# Patient Record
Sex: FEMALE | URBAN - METROPOLITAN AREA
[De-identification: names, ages, dates, MRNs, and addresses within clinical notes are randomized per-mention and may not be internally consistent; named-entity substitution may affect disease eponyms.]

---

## 2019-12-23 ENCOUNTER — OCC HEALTH (OUTPATIENT)
Dept: OTHER | Facility: HOSPITAL | Age: 29
End: 2019-12-23
Attending: PREVENTIVE MEDICINE

## 2019-12-23 DIAGNOSIS — Z01.84 IMMUNITY STATUS TESTING: Primary | ICD-10-CM

## 2019-12-23 PROCEDURE — 85025 COMPLETE CBC W/AUTO DIFF WBC: CPT

## 2019-12-23 PROCEDURE — 87389 HIV-1 AG W/HIV-1&-2 AB AG IA: CPT

## 2019-12-23 PROCEDURE — 86803 HEPATITIS C AB TEST: CPT

## 2019-12-23 PROCEDURE — 86706 HEP B SURFACE ANTIBODY: CPT

## 2019-12-23 PROCEDURE — 80053 COMPREHEN METABOLIC PANEL: CPT

## 2022-11-11 ENCOUNTER — NURSE ONLY (OUTPATIENT)
Dept: INTERNAL MEDICINE CLINIC | Facility: HOSPITAL | Age: 32
End: 2022-11-11
Attending: EMERGENCY MEDICINE

## 2022-11-11 DIAGNOSIS — Z00.00 WELLNESS EXAMINATION: Primary | ICD-10-CM

## 2022-11-11 PROCEDURE — 86480 TB TEST CELL IMMUN MEASURE: CPT

## 2022-11-14 LAB
M TB IFN-G CD4+ T-CELLS BLD-ACNC: 0.12 IU/ML
M TB TUBERC IFN-G BLD QL: NEGATIVE
M TB TUBERC IGNF/MITOGEN IGNF CONTROL: >10 IU/ML
QFT TB1 AG MINUS NIL: -0.01 IU/ML
QFT TB2 AG MINUS NIL: 0 IU/ML

## 2023-07-24 NOTE — TELEPHONE ENCOUNTER
Received staff message from provider to add patient on her ADO schedule for 8:15 AM 7/26/23    Will schedule patient tomorrow as RN needs to reach out to lead/PSR to open aforementioned spot.

## 2023-09-15 NOTE — TELEPHONE ENCOUNTER
Dr Trinity Calderón    Received fax from [facility] on 9/12/2023 for the patient's OV w/ Dr Efrem Everett on 9/12/2023    Please see OV notes in Care Everywhere.     Thank you

## 2023-09-21 NOTE — TELEPHONE ENCOUNTER
[x] [facility]  []MILE   [] [address] [facility]  Manager : Alfie Faboila    HAVE YOU RECEIVED THE COVID-19 Vaccine? Yes [x]    No []          If yes, date(s) received:          11/2/22, 12/29/22  Which vaccine:  Sanjeev Oneill []     Misty Davila []    J&J []  unknown    SYMPTOMS (reported via dashboard):  [] asymptomatic  [x] symptomatic  [] GI symptoms only    Symptom onset date: 9/21/23  Fever   > 100F             Yes []      Cough                          Yes [x]      Shortness of breath  Yes [x]      Congestion                 Yes []      Runny nose                Yes []        Loss of Smell              Yes []        Loss of Taste             Yes []       Sore throat                 Yes [x]       Fatigue                        Yes [x]       Body Aches                Yes [x]        Chills                           Yes [x]        Headache                   Yes [x]             GI symptoms             Yes [x]     No []                     Nausea   [x]          Vomiting            []                                    Diarrhea  []          Upset stomach [x]      Employee has positive COVID Exposure? Yes []     No [x]    Date of exposure:     []  Coworker                       [] patient                        [] Family/friend    Employee has a history of Covid?   Yes [x]     No []   If Yes, when: 11/29/22    When was the last shift you worked?: 9/21/23      PLAN:     COVID-19 testing ordered: [x] Rapid    [] Alinity              Date test is to be taken:   9/21/23    []  Outside testing                           Notes:    INSTRUCTIONS PROVIDED:    [x]  Employee was instructed to call Central scheduling at [phone] or use [employer] to make an appointment for their testing   [x]  May return to work if employee views negative result in [address] and remains fever, vomiting, and diarrhea free  []  May continue to work if remains asymptomatic and views negative result in [address]  []  Follow up for condition update when resulting  []  If symptoms develop, stay home and call hotline for rapid test order  []  If COVID positive results, off work minimum of 5 days from positive test or onset of symptoms (day 0)    []      On day 5, if asymptomatic or mildly symptomatic (with improving symptoms) may return to work day 6  []      On day 5, if symptomatic, call Employee Health for RTW screening        [x]  Plan noted above  []  Length of time to obtain results  []  Quarantine instructions  []  S/S of worsening infection/condition and importance of prompt medical re-evaluation including when to seek emergency care.    [] The employee voiced understanding

## 2023-09-26 NOTE — TELEPHONE ENCOUNTER
Results and RTW guidelines:    COVID RESULT:    [x] Viewed by employee in [address] RTW plan and instructions as indicated on triage call. Manager notified. Estimated RTW date:   [] Discussed with employee   [] Unable to reach by phone. Sent via [employer] message      Test type:    [x] Rapid         [] Alinity         [] Outside test:       [x] NEGATIVE     Ordered Alinity retest?  []Yes   [x] No (skip to RTW)   Ordered Rapid retest?   []Yes   [x] No (skip to RTW)        Notes:     RTW PLAN:    []  If COVID positive results, off work minimum of 5 days from positive test or onset of symptoms (day 0)        On day 5, if asymptomatic or mildly symptomatic (with improving symptoms) may return to work day 6          On day 5, if symptomatic, call Employee Health for RTW screening        []  COVID positive result - call Employee Health on day 5 after symptom onset. The employee needs to be cleared by Employee Health to RTW. [x] RTW immediately, continue to monitor for sx  [] RTW when sx improve; must be fever free for 24 hours w/o medications, Diarrhea/Vomiting free for 24 hours w/o medications  [] Alinity ordered; continue to monitor sx and call for new/worsening sx.   Discuss RTW guidelines with manager  [] May continue to work  [] Follow up with PCP  [] Home until further instruction from hotline with Alinity results  INSTRUCTIONS PROVIDED:  [x]  Plan as noted above  []  Length of time to obtain results   []  Quarantine instructions  []  Masking protocol   []  S/S of worsening infection/condition and importance of prompt medical re-evaluation including when to seek emergency care  [] If symptoms develop, stay home and call hotline for rapid test order    Estimated RTW date:      [] The employee voiced understanding of above plan/instructions  [x] Manager Notified

## 2023-09-30 NOTE — TELEPHONE ENCOUNTER
Worsening sleep. Requesting trazadone rx. 1. Sleep disturbance  - traZODone 100 MG Oral Tab; Take 1 tablet (100 mg total) by mouth nightly. Dispense: 30 tablet;  Refill: 0

## 2024-01-10 NOTE — PROGRESS NOTES
[facility] Consultation Note       Patient:  Lea Escobar  :      1990  MRN:      [id number]    Referring Provider: Dr. Nair       Chief Complaint:    Chief Complaint   Patient presents with    Consult     Non-surgical weight management consult     Weight Management       SUBJECTIVE     History of Present Illness:  Lea Escobar has been referred to me for evaluation and treatment.       32 yo who lives with family   She does the cooking  Currently at heaviest weight  Podiatrist    Patient has tried several diets in the past including exercises and is frustrated with increase of weight. Weight has been a struggle for the past several years and is now starting to develop into co-morbidities that are worrisome to the patient. Patient is interested in losing weight, so it can stay off long term.    Patient also understands that this is a life style change and wants to get on track.    Interested in non surgical weight loss    Past Medical History: History reviewed. No pertinent past medical history.    OBJECTIVE     Vitals: /70   Pulse 108   Ht 5' 4.6\" (1.641 m)   Wt 207 lb 11.2 oz (94.2 kg)   LMP 2023 (Exact Date)   SpO2 97%   BMI 34.99 kg/m²      Patient Medications:    Current Outpatient Medications   Medication Sig Dispense Refill    gabapentin 600 MG Oral Tab Take 2 tablets (1,200 mg total) by mouth nightly. TAKE AT BEDTIME      Tirzepatide-Weight Management (ZEPBOUND) 2.5 MG/0.5ML Subcutaneous Solution Auto-injector Inject 2.5 mg into the skin once a week. 3 mL 2    cyclobenzaprine 10 MG Oral Tab Take 1 tablet (10 mg total) by mouth 3 (three) times daily for 20 days. 30 tablet 0    albuterol 108 (90 Base) MCG/ACT Inhalation Aero Soln Inhale 2 puffs into the lungs every 6 (six) hours as needed for Wheezing. 6.7 g 0    LORazepam 1 MG Oral Tab Take 1 tablet (1 mg total) by mouth. (Patient not taking: Reported on 10/13/2023)         Allergies:  Patient  has no known allergies.     Comorbidities:      Social History:    Social History     Socioeconomic History    Marital status:      Spouse name: Not on file    Number of children: Not on file    Years of education: Not on file    Highest education level: Not on file   Occupational History    Not on file   Tobacco Use    Smoking status: Never    Smokeless tobacco: Never   Substance and Sexual Activity    Alcohol use: Not on file    Drug use: Not on file    Sexual activity: Not on file   Other Topics Concern    Not on file   Social History Narrative    Not on file     Social Determinants of Health     Financial Resource Strain: Not on file   Food Insecurity: Not on file   Transportation Needs: Not on file   Physical Activity: Not on file   Stress: Not on file   Social Connections: Not on file   Housing Stability: Not on file     Surgical History:    Past Surgical History:   Procedure Laterality Date             Family History:  History reviewed. No pertinent family history.        Typical Dietary Intake:  Breakfast AM Snack Lunch PM Snack Dinner   Eggs, croissants, coffee, bagel with cream cheese, hash brown chocolate Hot lunch, water, coffee Rice pudding Rice, stew, chicken, salad   Sweet tooth  Eats quickly  Portions    Soda Drinker?: No  If yes, how much?:      Number of restaurant or fast food meals/week:  3 meals/week    Nutritional Goals Reviewed and Discussed:     Limit carbohydrates to 100 gms per day, Eat 100-200 calories within 1 hour of waking up, and Eat 3-4 cups of fresh fruit or vegetables daily    Behavior Modifications Reviewed and Discussed:    Eat breakfast, Eat 3 meals per day, Plan meals in advance, Read nutrition labels, Drink 64oz of water per day, Maintain a daily food journal, No drinking 30 minutes before or after meals, Utilize portion control strategies to reduce calorie intake, Identify triggers for eating and manage cues, and Eat slowly and take 20 to 30 minutes to complete  each meal      ROS:  Constitutional: positive for fatigue  Respiratory: negative  Cardiovascular: negative  Gastrointestinal: negative  Musculoskeletal:negative  Neurological: negative  Behavioral/Psych: negative  Endocrine: negative  All other systems were reviewed and are negative.    Physical Exam:  General appearance: alert, appears stated age, cooperative, and moderately obese  Head: Normocephalic, without obvious abnormality, atraumatic  Back: symmetric, no curvature. ROM normal. No CVA tenderness.  Lungs: clear to auscultation bilaterally  Heart: S1, S2 normal, no murmur, click, rub or gallop, regular rate and rhythm  Abdomen: soft, non-tender; bowel sounds normal; no masses,  no organomegaly  Extremities: extremities normal, atraumatic, no cyanosis or edema  Pulses: 2+ and symmetric  Skin: Skin color, texture, turgor normal. No rashes or lesions  Neurologic: Grossly normal    ASSESSMENT         Encounter Diagnosis(ses):   1. Binge eating    2. Stress    3. Primary insomnia    4. Obesity (BMI 30-39.9)        PLAN     Patient is not interested in bariatric surgery. Patient desires to pursue traditional weight loss at this time.      Binge eating: will continue to eat  Large portions    Goals for next month:  1. Keep a food log.  2. Drink 48-64 ounces of non-caloric beverages per day. No fruit juices or regular soda.  3. Increase activity-upper body exercises, walk 10 minutes per day.  4. Increase fruit and vegetable servings to 5-6 per day.      Will start Zepbound    May benefit from Vyvanse    Refer to [facility]        Diagnoses and all orders for this visit:    Binge eating    Stress    Primary insomnia    Obesity (BMI 30-39.9)  -     Tirzepatide-Weight Management (ZEPBOUND) 2.5 MG/0.5ML Subcutaneous Solution Auto-injector; Inject 2.5 mg into the skin once a week.        Wilfrido Fitzgerald MD

## 2024-05-09 NOTE — TELEPHONE ENCOUNTER
Pt requesting annual lab work orders.       - CBC With Differential With Platelet; Future  - Comp Metabolic Panel (14); Future  - Hemoglobin A1C; Future  - Lipid Panel; Future  - TSH W Reflex To Free T4; Future  - Vitamin D; Future  - Vitamin B12; Future  - Iron And Tibc [E]; Future

## 2024-08-30 NOTE — PATIENT INSTRUCTIONS
A1C: 6.5% today --> previously was 5.7% on 5/10/2024  Blood glucose: 79 --> 74 in clinic today    Medications:   -Start Lantus 8 units daily in morning   -start Metformin 500mg with breakfast       - please give me an update on glucose readings on Monday night/Tuesday       A1C goal:  <6.0%    Blood sugar testing:  Start using Dexcom G7 continuous glucometer     Blood sugar targets:  Before breakfast: <90  2 hours after meals: <120     Call for persistent blood sugars < 75 or > 200

## 2024-08-30 NOTE — TELEPHONE ENCOUNTER
Current Outpatient Medications   Medication Sig Dispense Refill     90 Day Prescription Request  3 mL 0      30 each 0    metFORMIN 500 MG Oral Tab Take 1 tablet (500 mg total) by mouth daily with breakfast. 30 tablet 0

## 2024-08-30 NOTE — TELEPHONE ENCOUNTER
Current Outpatient Medications   Medication Sig Dispense Refill            Key:[id number]                     Continuous Glucose Sensor (DEXCOM G7 SENSOR) Does not apply Misc 1 each Every 10 days. Use as directed every 10 days 1 each 5

## 2024-08-30 NOTE — PROGRESS NOTES
Name: Lea Escobar  Date: 2024    CHIEF COMPLAINT   GDM, third trimester     HISTORY OF PRESENT ILLNESS   Lea Escobar is a 33 year old year old female who presents for new consultation on gestational diabetes management. She is currently 37/5 weeks pregnant and is currently followed by Dr. Nate Reed ([facility]) with OB. She is scheduled for elective  at 39 weeks.   HgbA1C: 6.5% at POC today. Previously was 5.7% on 5/10/2024.   Blood glucose is: 79 --> 74--> 107 in clinic today. Patient notes that she had been very busy at work today and had to postpone lunch meal. She plans to eat as soon as she if finished with visit.      FAMILY HISTORY OF DIABETES  - father and paternal grandmother; sister had GDM  DIABETES HISTORY  Diagnosed:   Prior HbA, C or glycohemoglobin were 5.4% 2023; 5.7% on 5/10/2024; 6.5% at POC today;     PREVIOUS MEDICATION FOR DM:  None   CURRENT MEDICATIONS FOR DM:  None     HOME GLUCOSE READINGS:   Dexcom G7 CGM download reviewed with patient. The results revealed ( to ):    Average glucose: 119mg/dl     In target range: (70-180mg/dl) :96%     High glucose targets: (> 180mg/dl): 2%     Very high glucose targets: (> 250mg/dl): 0%     Low glucose targets: (less than 70mg/dl): 2%     Very Low glucose targets: (< 54mg/dl ): 0%     Glucose Management Indicator (GMI): n/a  Glucose Variability: 28mg/dL    Glucose interpretation:   -Postprandial hyperglycemia was noted at 2hrs after meals with readings in 130s-140s  -overnight glucose readings range between 90s-110s.   - overnight compression low glucose readings noted.     DIETARY COMPLIANCE:  Breakfast:    Boiled eggs and coffee with sugar and cream (maybe cheese)  Lunch:    Physician louge food; chicken with hot/sweet sauces and zhucini      Dinner around 8:30pm    Chicken wings and lentil soup       Eating more protein and greens      EXERCISE:   No - walking as much as safely able     REVIEW  OF SYSTEMS  Constitutional: Negative for: weight change, fever, fatigue, cold/heat intolerance  Eyes: Negative for:  Visual changes, proptosis, blurring  ENT: Negative for:  dysphagia, neck swelling, dysphonia  Respiratory: Negative for: hemoptysis, shortness of breath, cough, or dyspnea.  Cardiovascular: Negative for:  chest pain, chest discomfort, palpitations  GI: Negative for:  abdominal pain, nausea, vomiting, diarrhea, heartburn, constipation  Neurology: Negative for: headache, dizziness, syncope, numbness/tingling, or weakness.   Genito-Urinary: Negative for: dysuria, frequency or hematuria   Hematology/Lymphatics: Negative for: bruising, easy bleeding, lower extremity edema  Endocrine: Negative for: polyuria, polydipsia. no thyroid disease. no osteoporosis.   Skin: Negative for: rash, blister, infection or ulcers.    MEDICATIONS:     Current Outpatient Medications:     insulin glargine (LANTUS SOLOSTAR) 100 UNIT/ML Subcutaneous Solution Pen-injector, Inject 8 Units into the skin every morning., Disp: 3 mL, Rfl: 0    Insulin Pen Needle 32G X 4 MM Does not apply Misc, Use daily with Lantus insulin, Disp: 30 each, Rfl: 0    metFORMIN 500 MG Oral Tab, Take 1 tablet (500 mg total) by mouth daily with breakfast., Disp: 30 tablet, Rfl: 0    Alcohol Swabs Does not apply Pads, Use daily, Disp: 30 each, Rfl: 0    Lancets 30G Does not apply Misc, Use once daily, Disp: 100 each, Rfl: 0    Lancet Devices (LANCING DEVICE) Does not apply Misc, Use once daily, Disp: 1 each, Rfl: 0    Glucose Blood In Vitro Strip, Use to check fasting blood sugar once daily, Disp: 100 strip, Rfl: 1    Ferrous Sulfate 325 (65 Fe) MG Oral Tab, Take 1 tablet (325 mg total) by mouth daily with breakfast., Disp: 90 tablet, Rfl: 1    Vitamin C 500 MG Oral Tab, Take 1 tablet (500 mg total) by mouth daily., Disp: 90 tablet, Rfl: 1    zolpidem (AMBIEN) 5 MG Oral Tab, Take 1 tablet (5 mg total) by mouth nightly as needed for Sleep., Disp: 10 tablet,  Rfl: 1    gabapentin 600 MG Oral Tab, Take 2 tablets (1,200 mg total) by mouth nightly. TAKE AT BEDTIME (Patient not taking: Reported on 2024), Disp: , Rfl:     Tirzepatide-Weight Management (ZEPBOUND) 2.5 MG/0.5ML Subcutaneous Solution Auto-injector, Inject 2.5 mg into the skin once a week. (Patient not taking: Reported on 2024), Disp: 3 mL, Rfl: 2    gabapentin 100 MG Oral Cap, Take 1 capsule (100 mg total) by mouth nightly. (Patient not taking: Reported on 2024), Disp: 30 capsule, Rfl: 0    albuterol 108 (90 Base) MCG/ACT Inhalation Aero Soln, Inhale 2 puffs into the lungs every 6 (six) hours as needed for Wheezing., Disp: 6.7 g, Rfl: 0    LORazepam 1 MG Oral Tab, Take 1 tablet (1 mg total) by mouth. (Patient not taking: Reported on 10/13/2023), Disp: , Rfl:     ALLERGIES:   No Known Allergies    SOCIAL HISTORY:   Social History     Socioeconomic History    Marital status:    Tobacco Use    Smoking status: Never    Smokeless tobacco: Never     PAST MEDICAL HISTORY:   History reviewed. No pertinent past medical history.    PAST SURGICAL HISTORY:   Past Surgical History:   Procedure Laterality Date           PHYSICAL EXAM:   Vitals:    24 1335   BP: 121/75   Pulse: 93   Weight: 252 lb (114.3 kg)   Height: 5' 4.6\" (1.641 m)     BMI:   Body mass index is 42.46 kg/m².    PHYSICAL EXAM  General Appearance:  alert, well developed, in no acute distress  Eyes:  normal conjunctivae, sclera., normal sclera and normal pupils  Throat/Neck: normal sound to voice.   Back: no kyphosis  Respiratory:  non-labored. no increased work of breathing.    Skin:  normal moisture and skin texture  Hematologic:  no excessive bruising  Psychiatric:  oriented to time, self, and place      LABS: Pertinent labs reviewed    ASSESSMENT/PLAN:    -Reviewed with patient the pathogenesis of diabetes, clinical significance of A1c, and common complications such as: microvascular, macrovascular and diabetic  ketoacidosis. Patient verbalizes understanding of the importance of glycemic control and the goals of therapy.   -Discussed with patient glucose targets ranges (Fasting <90 and 2hr post prandial <120).   1.Gestational Diabetes Mellitus, third trimester   -LAB DATA  HbA,C: 6.5% today   a) Medications  - start Lantus 8 units daily in AM   - start Metformin 500mg daily with breakfast   - new cgm sensor applied in clinic today (previous had fallen off)     - discussed that she gives us an update on her glucose readings in 3-4 days and again in 3-4 days after that.   -reviewed limiting carbs to 165gm of carbs/day.   -discussed importance of incorporating low carb snacks during the day.   -reviewed blood sugar target goals: fasting <90 and 2hrs post prandial <120  -Discussed dangers of pregnancy and possible fetal complications.   -Discussed importance of glycemic control for fetus  -Discussed insulin injection site rotation    b) No nephropathy: GFR: 127 on 5/10/2024  c) continue wearing Dexcom g7 cgm  d) Life style changes: Diet: low carbohydrate diet 165-175gm per day discussed, Exercise: at least 150min a week.    2. Blood Pressure Management   -normotensive today     3.Lipids Management   -LDL: 117 and Tri on 5/10/2024      RTC as needed     Patient instructed to call sooner if they develop Blood glucose readings <75 and/or if they have readings persistently >200.     The risks and benefits of my recommendations, as well as other treatment options were discussed with the patient today. questions were also answered to the best of my knowledge. Patient verbalizes understanding of these issues and agrees to the plan.    2024  DAKOTAH Nixon

## 2024-09-03 NOTE — TELEPHONE ENCOUNTER
Medication PA Requested: DEXCOM G7 SENSOR                                                          CoverMyMeds Used:  Key:  Quantity: 9  Day Supply: 90  Sig: use 1 every 10 days  DX Code:              O24.410

## 2024-09-04 NOTE — TELEPHONE ENCOUNTER
Medication PA Requested: DEXCOM G7 SENSOR                                                          CoverMyMeds Used: Yes  Key: [id number]  Quantity: 9  Day Supply: 90  Sig: use 1 every 10 days  DX Code:  O24.410        Cover My Meds submitted, last office visit 8/30 and A1C 8/30  Awaiting determination

## 2024-09-04 NOTE — TELEPHONE ENCOUNTER
Continuous Glucose Sensor (DEXCOM G7 SENSOR) Does not apply Misc, 1 each Every 10 days. Use as directed every 10 days, Disp: 1 each, Rfl: 5  Plan does not cover med. Contact pharmacy. Patient id:[id number]

## 2024-12-26 NOTE — PROGRESS NOTES
[facility], [place]  [address]  [facility] [address]  Dept: [phone]       Patient:  Lea Escobar  :      1990  MRN:      [id number]    Chief Complaint:    Chief Complaint   Patient presents with    Follow - Up    Weight Management       SUBJECTIVE     History of Present Illness:  Lea is being seen today for a follow-up for non surgical weight loss    Past Medical History:   Past Medical History:    Dyslipidemia        Comorbidities:  Back pain-Improvement?  yes, Joint pain-Improvement?  yes, Diabetes-Improvement?  yes, LORE-Improvement?  yes, and Snoring-Improvement?  yes    OBJECTIVE     Vitals: /80   Pulse 89   Ht 5' 4.6\" (1.641 m)   Wt 226 lb (102.5 kg)   SpO2 98%   BMI 38.08 kg/m²     Initial weight loss: +19   Total weight loss: +19    Start weight: 226    Wt Readings from Last 3 Encounters:   24 226 lb (102.5 kg)   24 252 lb (114.3 kg)   01/10/24 207 lb 11.2 oz (94.2 kg)       Patient Medications:    Current Outpatient Medications   Medication Sig Dispense Refill    Tirzepatide (MOUNJARO) 2.5 MG/0.5ML Subcutaneous Solution Auto-injector Inject 2.5 mg into the skin once a week. 2 mL 2    ergocalciferol 1.25 MG (20437 UT) Oral Cap Take 1 capsule (50,000 Units total) by mouth once a week for 12 doses. 12 capsule 0    traZODone 100 MG Oral Tab Take 1 tablet (100 mg total) by mouth nightly. 90 tablet 1    Ferrous Sulfate 325 (65 Fe) MG Oral Tab Take 1 tablet (325 mg total) by mouth daily with breakfast. 90 tablet 1    Vitamin C 500 MG Oral Tab Take 1 tablet (500 mg total) by mouth daily. 90 tablet 1    zolpidem (AMBIEN) 5 MG Oral Tab Take 1 tablet (5 mg total) by mouth nightly as needed for Sleep. 10 tablet 1    gabapentin 600 MG Oral Tab Take 2 tablets (1,200 mg total) by mouth nightly. TAKE AT BEDTIME (Patient not taking: Reported on 2024)      gabapentin 100 MG Oral Cap Take 1 capsule (100  mg total) by mouth nightly. (Patient not taking: Reported on 8/30/2024) 30 capsule 0    LORazepam 1 MG Oral Tab Take 1 tablet (1 mg total) by mouth. (Patient not taking: Reported on 10/13/2023)       Allergies:  Patient has no known allergies.     Social History:    Social History     Socioeconomic History    Marital status:      Spouse name: Not on file    Number of children: Not on file    Years of education: Not on file    Highest education level: Not on file   Occupational History    Not on file   Tobacco Use    Smoking status: Never    Smokeless tobacco: Never   Substance and Sexual Activity    Alcohol use: Not on file    Drug use: Not on file    Sexual activity: Not on file   Other Topics Concern    Not on file   Social History Narrative    Not on file     Social Drivers of Health     Financial Resource Strain: Low Risk  (9/16/2024)    Received from [facility]    Overall Financial Resource Strain (CARDIA)     Difficulty of Paying Living Expenses: Not hard at all   Food Insecurity: No Food Insecurity (9/16/2024)    Received from [facility]    Hunger Vital Sign     Worried About Running Out of Food in the Last Year: Never true     Ran Out of Food in the Last Year: Never true   Transportation Needs: No Transportation Needs (9/16/2024)    Received from [facility]    PRAPARE - Transportation     Lack of Transportation (Medical): No     Lack of Transportation (Non-Medical): No   Physical Activity: Not on file   Stress: Not on file   Social Connections: Low Risk  (6/14/2023)    [facility]    Social Connections     How often do you see or talk to people that you care about and feel close to? (For example: talking to friends on the phone, visiting friends or family, going to Buddhism or club meetings): 5 or more times a week   Housing Stability: Low Risk  (9/16/2024)    Received from [facility]  [facility]    Housing Stability Vital Sign     Unable to Pay for Housing in the Last Year: No     Number of Times Moved in the Last Year: 1     Homeless in the Last Year: No     Surgical History:    Past Surgical History:   Procedure Laterality Date           Family History:  No family history on file.    Food Journal  Reviewed and Discussed:       Patient has a Food Journal?: yes   Patient is reading nutrition labels?  yes  Average Caloric Intake:     Average CHO Intake: 120  Is patient exercising? no  Type of exercise?     Eating Habits  Patient states the following:  Eats 1 meal(s) per day  Length of time it takes to consume a meal:  20  # of snacks per day: 1 Type of snacks:  protein bar  Amount of soda consumption per day:    Amount of water (in ounces) per day:  64  Drinking between meals only:  yes  Toughest challenge:  exercise    Nutritional Goals  Limit carbohydrates to 100 gms per day, Eat 100-200 calories within 1 hour of waking , and Eat 3-4 cups of fresh fruits or vegetables daily    Behavior Modifications Reviewed and Discussed  Eat breakfast, Eat 3 meals per day, Plan meals in advance, Read nutrition labels, Drink 64 oz of water per day, Maintain a daily food journal, No drinking 30 minutes before or after meals, Utlize portion control strategies to reduce calorie intake, Identify triggers for eating and manage cues, and Eat slowly and take 20 to 30 minutes to complete each meal    Exercise Goals Reviewed and Discussed    Increase as tolerated    ROS:    Constitutional: negative  Respiratory: negative  Cardiovascular: negative  Gastrointestinal: negative  Musculoskeletal:negative  Neurological: negative  Behavioral/Psych: negative  Endocrine: negative  All other systems were reviewed and are negative    Physical Exam:   General appearance: alert, appears stated age, cooperative, and mildly obese  Head: Normocephalic, without obvious abnormality, atraumatic  Back: symmetric, no  curvature. ROM normal. No CVA tenderness.  Lungs: clear to auscultation bilaterally  Heart: S1, S2 normal, no murmur, click, rub or gallop, regular rate and rhythm  Abdomen: soft, non-tender; bowel sounds normal; no masses,  no organomegaly  Extremities: extremities normal, atraumatic, no cyanosis or edema  Pulses: 2+ and symmetric  Skin: Skin color, texture, turgor normal. No rashes or lesions  Neurologic: Grossly normal    ASSESSMENT       Encounter Diagnosis(ses):   Encounter Diagnoses   Name Primary?    Insulin controlled gestational diabetes mellitus (GDM) during pregnancy, antepartum (HCC) Yes    Binge eating     Dyslipidemia     Stress     Obesity (BMI 30-39.9)        PLAN     Patient is not interested in bariatric surgery. Patient desires to pursue traditional weight loss at this time.      DIABETES: Continue current medications.    DYSLIPIDEMIA: Stable on the above prescribed meal plan . Liver function stable.    Lab Results   Component Value Date/Time    CHOLEST 178 12/17/2024 11:13 AM    LDL 87 12/17/2024 11:13 AM    HDL 60 (H) 12/17/2024 11:13 AM    TRIG 182 (H) 12/17/2024 11:13 AM    VLDL 29 12/17/2024 11:13 AM     Goals for next month:  1. Keep a food log.  2. Drink 48-64 ounces of non-caloric beverages per day. No fruit juices or regular soda.  3. Increase activity-upper body exercises, walk 10 minutes per day.  4. Increase fruit and vegetable servings to 5-6 per day.      Will start Mounjaro    Aware she should discontinue if pregnancy occurs    Diagnoses and all orders for this visit:    Insulin controlled gestational diabetes mellitus (GDM) during pregnancy, antepartum (HCC)  -     Tirzepatide (MOUNJARO) 2.5 MG/0.5ML Subcutaneous Solution Auto-injector; Inject 2.5 mg into the skin once a week.    Binge eating    Dyslipidemia    Stress    Obesity (BMI 30-39.9)          Wilfrido Fitzgerald MD

## 2025-03-03 NOTE — TELEPHONE ENCOUNTER
Condition update: Navigator called [employer] insurance to review Dexcom G7 sensor coverage. Per representative a PA is required. Navigator initiated PA after review the PA was approved as there was an approved PA in record valid from August 4,2024-Sept 4,2026. Per representative patient is to use a [facility] or [facility] pharmacy. Test claim was process for a 30-day supply it will be $71.51 & for a 90-day supply it will be $150.   Message sent to provider.     Dexcom G7  QTY: 3  Case #: [id number]  Valid August 4,2026-Sept 4,2026

## 2025-06-02 NOTE — TELEPHONE ENCOUNTER
Pt scheduled on Thursday at 1220 per Dr. Pitts. RN LMTCB to confirm scheduled appointment with patient. RN also left a message via 115 network disks. Pt to confirm scheduled appointment..

## 2025-06-06 NOTE — PROGRESS NOTES
Lea Escobar is a 34 year old female.   Chief Complaint   Patient presents with    New Patient    Nose Problem     Left nostril is tender and sensitive      HPI:   Patient here for nasal crusting and tenderness of her left nares    Current Medications[1]   Past Medical History[2]   Social History:  Short Social Hx on File[3]     REVIEW OF SYSTEMS:   GENERAL HEALTH: feels well otherwise  GENERAL : denies fever, chills, sweats, weight loss, weight gain  SKIN: denies any unusual skin lesions or rashes  RESPIRATORY: denies shortness of breath with exertion  NEURO: denies headaches    EXAM:   Ht 5' 4.6\" (1.641 m)   Wt 189 lb 9.6 oz (86 kg)   BMI 31.94 kg/m²   System Details   Skin Inspection - Normal.   Constitutional Overall appearance - Normal.   Head/Face Facial features - Normal. Eyebrows - Normal. Skull - Normal.   Eyes Conjunctiva - Right: Normal, Left: Normal. Pupil - Right: Normal, Left: Normal.    Ears Inspection - Right: Normal, Left: Normal.   Canal - Right: Normal, Left: Normal.   TM - Right: Normal, Left: Normal.   Nasal External nose - Normal.   Nasal septum - Normal.  Turbinates - Normal.  Crusting of the nasal vestibule left greater than right.  Culture taken on the left.   Oral/Oropharynx Lips - Normal, Tonsils - Normal, Tongue - Normal    Neck Exam Inspection - Normal. Palpation - Normal. Parotid gland - Normal. Thyroid gland - Normal.   Lymph Detail Submental. Submandibular. Anterior cervical. Posterior cervical. Supraclavicular all without enlargement   Psychiatric Orientation - Oriented to time, place, person & situation. Appropriate mood and affect.   Neurological Memory - Normal. Cranial nerves - Cranial nerves II through XII grossly intact.     ASSESSMENT AND PLAN:   1. Nasal crusting  Culture taken as above.  Will check for MRSA.  Patient placed on Bactrim DS and mupirocin ointment.  I will of course call the patient with all results.  Follow-up if symptoms do not completely  resolved.    - Aerobic Bacterial Culture; Future  - Aerobic Bacterial Culture      The patient indicates understanding of these issues and agrees to the plan.      Celia Pitts MD  6/5/2025  7:26 PM       [1]   Current Outpatient Medications   Medication Sig Dispense Refill    OLANZapine 10 MG Oral Tab Take 1 tablet (10 mg total) by mouth nightly.      sulfamethoxazole-trimethoprim -160 MG Oral Tab per tablet Take 1 tablet by mouth every 12 (twelve) hours. 20 tablet 0    mupirocin 2 % External Ointment Apply with cotton swab to bilateral nares twice as needed for crusting. 15 g 2    Tirzepatide (MOUNJARO) 7.5 MG/0.5ML Subcutaneous Solution Auto-injector Inject 7.5 mg into the skin once a week. 2 mL 5    Continuous Glucose Sensor (DEXCOM G7 SENSOR) Does not apply Misc 1 each Every 10 days. Use as directed every 10 days 3 each 5    traZODone 100 MG Oral Tab Take 1 tablet (100 mg total) by mouth nightly. 90 tablet 1    Ferrous Sulfate 325 (65 Fe) MG Oral Tab Take 1 tablet (325 mg total) by mouth daily with breakfast. 90 tablet 1    Vitamin C 500 MG Oral Tab Take 1 tablet (500 mg total) by mouth daily. 90 tablet 1    zolpidem (AMBIEN) 5 MG Oral Tab Take 1 tablet (5 mg total) by mouth nightly as needed for Sleep. 10 tablet 1    gabapentin 600 MG Oral Tab Take 2 tablets (1,200 mg total) by mouth nightly. TAKE AT BEDTIME (Patient not taking: Reported on 6/5/2025)      gabapentin 100 MG Oral Cap Take 1 capsule (100 mg total) by mouth nightly. (Patient not taking: Reported on 6/5/2025) 30 capsule 0    LORazepam 1 MG Oral Tab Take 1 tablet (1 mg total) by mouth. (Patient not taking: Reported on 6/5/2025)     [2]   Past Medical History:   Dyslipidemia   [3]   Social History  Socioeconomic History    Marital status:    Tobacco Use    Smoking status: Never    Smokeless tobacco: Never   Vaping Use    Vaping status: Never Used   Substance and Sexual Activity    Alcohol use: Never    Drug use: Never     Social  Drivers of Health     Food Insecurity: No Food Insecurity (9/16/2024)    Received from [facility]    Hunger Vital Sign     Worried About Running Out of Food in the Last Year: Never true     Ran Out of Food in the Last Year: Never true   Transportation Needs: No Transportation Needs (9/16/2024)    Received from [facility]    PRAPARE - Transportation     Lack of Transportation (Medical): No     Lack of Transportation (Non-Medical): No   Housing Stability: Low Risk  (9/16/2024)    Received from [facility]    Housing Stability Vital Sign     Unable to Pay for Housing in the Last Year: No     Number of Times Moved in the Last Year: 1     Homeless in the Last Year: No

## 2025-06-06 NOTE — PATIENT INSTRUCTIONS
Culture was taken and you were placed on mupirocin ointment and Bactrim for your nasal crusting.  I will of course notify you of the culture results.  Follow-up if symptoms do not completely resolved.

## 2025-06-09 NOTE — TELEPHONE ENCOUNTER
Received directions for mupirocin via secure chat from Dr. Pitts.    Contacted [facility] pharmacy, per [place] pharmacist given directions for mupirocin 2% ointment twice a day to each nostril until crusting resolves or 10 days total  and then may repeat as needed. Verbalized understanding.   Will fill prescription for patient.

## 2025-06-09 NOTE — TELEPHONE ENCOUNTER
Per Taniya calling to clarify mupirocin 2% ointment directions, if it is twice daily or two times total. Please advise

## 2025-06-10 NOTE — TELEPHONE ENCOUNTER
Patient with significant constipation.  Has already tried over-the-counter medications with minimal symptoms.  Will trial Linzess.    1. Constipation, unspecified constipation type    - linaCLOtide (LINZESS) 72 MCG Oral Cap; Take 72 mcg by mouth daily. Take Linzess at least 30 minutes before your first meal of the day, on an empty stomach.  Dispense: 30 capsule; Refill: 1